# Patient Record
Sex: MALE | Race: WHITE
[De-identification: names, ages, dates, MRNs, and addresses within clinical notes are randomized per-mention and may not be internally consistent; named-entity substitution may affect disease eponyms.]

---

## 2018-01-16 ENCOUNTER — HOSPITAL ENCOUNTER (EMERGENCY)
Dept: HOSPITAL 95 - ER | Age: 63
Discharge: HOME | End: 2018-01-16
Payer: COMMERCIAL

## 2018-01-16 VITALS — BODY MASS INDEX: 31.5 KG/M2 | HEIGHT: 70 IN | WEIGHT: 220 LBS

## 2018-01-16 DIAGNOSIS — I25.2: ICD-10-CM

## 2018-01-16 DIAGNOSIS — Z87.891: ICD-10-CM

## 2018-01-16 DIAGNOSIS — Z79.899: ICD-10-CM

## 2018-01-16 DIAGNOSIS — G89.29: Primary | ICD-10-CM

## 2018-01-16 DIAGNOSIS — M54.5: ICD-10-CM

## 2018-12-28 ENCOUNTER — HOSPITAL ENCOUNTER (OUTPATIENT)
Dept: HOSPITAL 95 - ORSCMMR | Age: 63
Discharge: HOME | End: 2018-12-28
Attending: SURGERY
Payer: COMMERCIAL

## 2018-12-28 VITALS — HEIGHT: 69 IN | WEIGHT: 198 LBS | BODY MASS INDEX: 29.33 KG/M2

## 2018-12-28 DIAGNOSIS — Z79.899: ICD-10-CM

## 2018-12-28 DIAGNOSIS — K57.30: ICD-10-CM

## 2018-12-28 DIAGNOSIS — I25.2: ICD-10-CM

## 2018-12-28 DIAGNOSIS — Z12.11: Primary | ICD-10-CM

## 2018-12-28 DIAGNOSIS — Z80.0: ICD-10-CM

## 2018-12-28 DIAGNOSIS — Z79.01: ICD-10-CM

## 2018-12-28 DIAGNOSIS — Z87.891: ICD-10-CM

## 2018-12-28 PROCEDURE — 0DJD8ZZ INSPECTION OF LOWER INTESTINAL TRACT, VIA NATURAL OR ARTIFICIAL OPENING ENDOSCOPIC: ICD-10-PCS | Performed by: SURGERY

## 2018-12-28 NOTE — NUR
1000-
UP TO DRESS. GAIT CONSISTANT WITH BASELINE. STEADY WITH CRUTCHES. DENIES
DIZZINESS. VSS. NO C/O.
Discharge instructions reviewed with patient. Patient verbalizes understanding.
Copy given to patient to take home.
Patient States Post-Procedure ride home has been arranged with his wife.

## 2018-12-28 NOTE — NUR
12/28/18 0906 Mely Yoon
History, Chart, Medications and Allergies reviewed before start of
procedure. Patient confirms NPO status and agrees with scheduled
surgery. MONITOR INTACT WITH CONTINUOUS PULSE OXIMETRY AND
INTERMITTENT BP.

## 2018-12-28 NOTE — NUR
Ambulatory in Day Surgery WITH CRUTCHES
History, Chart, Medications and Allergies reviewed before start of
procedure.Lungs clear T/O to Auscultation.
Patient States Post-Procedure ride home has been arranged.
Patient confirms NPO status and agrees with scheduled surgery.